# Patient Record
Sex: MALE | Race: WHITE | NOT HISPANIC OR LATINO | URBAN - METROPOLITAN AREA
[De-identification: names, ages, dates, MRNs, and addresses within clinical notes are randomized per-mention and may not be internally consistent; named-entity substitution may affect disease eponyms.]

---

## 2017-08-27 ENCOUNTER — EMERGENCY (EMERGENCY)
Facility: HOSPITAL | Age: 32
LOS: 1 days | Discharge: HOME | End: 2017-08-27
Attending: EMERGENCY MEDICINE
Payer: SELF-PAY

## 2017-08-27 VITALS
SYSTOLIC BLOOD PRESSURE: 110 MMHG | WEIGHT: 255.07 LBS | TEMPERATURE: 98 F | HEART RATE: 104 BPM | OXYGEN SATURATION: 96 % | RESPIRATION RATE: 17 BRPM | DIASTOLIC BLOOD PRESSURE: 80 MMHG

## 2017-08-27 DIAGNOSIS — Z77.21 CONTACT WITH AND (SUSPECTED) EXPOSURE TO POTENTIALLY HAZARDOUS BODY FLUIDS: ICD-10-CM
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
## 2017-08-27 DIAGNOSIS — S50.811A ABRASION OF RIGHT FOREARM, INITIAL ENCOUNTER: ICD-10-CM
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
## 2017-08-27 LAB
ALBUMIN SERPL ELPH-MCNC: 4.2 G/DL — SIGNIFICANT CHANGE UP (ref 3.5–5)
ALP SERPL-CCNC: 39 U/L — LOW (ref 40–120)
ALT FLD-CCNC: 93 U/L DA — HIGH (ref 10–60)
ANION GAP SERPL CALC-SCNC: 7 MMOL/L — SIGNIFICANT CHANGE UP (ref 5–17)
AST SERPL-CCNC: 52 U/L — HIGH (ref 10–40)
BASOPHILS # BLD AUTO: 0.1 K/UL — SIGNIFICANT CHANGE UP (ref 0–0.2)
BASOPHILS NFR BLD AUTO: 1.3 % — SIGNIFICANT CHANGE UP (ref 0–2)
BILIRUB SERPL-MCNC: 0.5 MG/DL — SIGNIFICANT CHANGE UP (ref 0.2–1.2)
BUN SERPL-MCNC: 12 MG/DL — SIGNIFICANT CHANGE UP (ref 7–18)
CALCIUM SERPL-MCNC: 9.2 MG/DL — SIGNIFICANT CHANGE UP (ref 8.4–10.5)
CHLORIDE SERPL-SCNC: 110 MMOL/L — HIGH (ref 96–108)
CO2 SERPL-SCNC: 26 MMOL/L — SIGNIFICANT CHANGE UP (ref 22–31)
CREAT SERPL-MCNC: 0.88 MG/DL — SIGNIFICANT CHANGE UP (ref 0.5–1.3)
EOSINOPHIL # BLD AUTO: 0.1 K/UL — SIGNIFICANT CHANGE UP (ref 0–0.5)
EOSINOPHIL NFR BLD AUTO: 1.4 % — SIGNIFICANT CHANGE UP (ref 0–6)
GLUCOSE SERPL-MCNC: 119 MG/DL — HIGH (ref 70–99)
HAV IGM SER-ACNC: SIGNIFICANT CHANGE UP
HBV CORE IGM SER-ACNC: SIGNIFICANT CHANGE UP
HBV SURFACE AG SER-ACNC: SIGNIFICANT CHANGE UP
HCT VFR BLD CALC: 49.3 % — SIGNIFICANT CHANGE UP (ref 39–50)
HCV AB S/CO SERPL IA: 0.1 S/CO — SIGNIFICANT CHANGE UP
HCV AB SERPL-IMP: SIGNIFICANT CHANGE UP
HGB BLD-MCNC: 17.4 G/DL — HIGH (ref 13–17)
HIV 1+2 AB+HIV1 P24 AG SERPL QL IA: SIGNIFICANT CHANGE UP
LYMPHOCYTES # BLD AUTO: 39.2 % — SIGNIFICANT CHANGE UP (ref 13–44)
LYMPHOCYTES # BLD AUTO: 4.1 K/UL — HIGH (ref 1–3.3)
MCHC RBC-ENTMCNC: 31.5 PG — SIGNIFICANT CHANGE UP (ref 27–34)
MCHC RBC-ENTMCNC: 35.2 GM/DL — SIGNIFICANT CHANGE UP (ref 32–36)
MCV RBC AUTO: 89.4 FL — SIGNIFICANT CHANGE UP (ref 80–100)
MONOCYTES # BLD AUTO: 0.7 K/UL — SIGNIFICANT CHANGE UP (ref 0–0.9)
MONOCYTES NFR BLD AUTO: 6.9 % — SIGNIFICANT CHANGE UP (ref 2–14)
NEUTROPHILS # BLD AUTO: 5.3 K/UL — SIGNIFICANT CHANGE UP (ref 1.8–7.4)
NEUTROPHILS NFR BLD AUTO: 51.3 % — SIGNIFICANT CHANGE UP (ref 43–77)
PLATELET # BLD AUTO: 192 K/UL — SIGNIFICANT CHANGE UP (ref 150–400)
POTASSIUM SERPL-MCNC: 4 MMOL/L — SIGNIFICANT CHANGE UP (ref 3.5–5.3)
POTASSIUM SERPL-SCNC: 4 MMOL/L — SIGNIFICANT CHANGE UP (ref 3.5–5.3)
PROT SERPL-MCNC: 7.8 G/DL — SIGNIFICANT CHANGE UP (ref 6–8.3)
RBC # BLD: 5.51 M/UL — SIGNIFICANT CHANGE UP (ref 4.2–5.8)
RBC # FLD: 11.2 % — SIGNIFICANT CHANGE UP (ref 10.3–14.5)
SODIUM SERPL-SCNC: 143 MMOL/L — SIGNIFICANT CHANGE UP (ref 135–145)
WBC # BLD: 10.4 K/UL — SIGNIFICANT CHANGE UP (ref 3.8–10.5)
WBC # FLD AUTO: 10.4 K/UL — SIGNIFICANT CHANGE UP (ref 3.8–10.5)

## 2017-08-27 PROCEDURE — 87389 HIV-1 AG W/HIV-1&-2 AB AG IA: CPT

## 2017-08-27 PROCEDURE — 85027 COMPLETE CBC AUTOMATED: CPT

## 2017-08-27 PROCEDURE — 99283 EMERGENCY DEPT VISIT LOW MDM: CPT

## 2017-08-27 PROCEDURE — 36415 COLL VENOUS BLD VENIPUNCTURE: CPT

## 2017-08-27 PROCEDURE — 36000 PLACE NEEDLE IN VEIN: CPT

## 2017-08-27 PROCEDURE — 99053 MED SERV 10PM-8AM 24 HR FAC: CPT

## 2017-08-27 PROCEDURE — 80053 COMPREHEN METABOLIC PANEL: CPT

## 2017-08-27 PROCEDURE — 99284 EMERGENCY DEPT VISIT MOD MDM: CPT | Mod: 25

## 2017-08-27 PROCEDURE — 80074 ACUTE HEPATITIS PANEL: CPT

## 2017-08-27 RX ORDER — RALTEGRAVIR 400 MG/1
1 TABLET, FILM COATED ORAL
Qty: 42 | Refills: 0 | OUTPATIENT
Start: 2017-08-27 | End: 2017-09-17

## 2017-08-27 RX ORDER — EMTRICITABINE AND TENOFOVIR DISOPROXIL FUMARATE 200; 300 MG/1; MG/1
1 TABLET, FILM COATED ORAL ONCE
Qty: 0 | Refills: 0 | Status: COMPLETED | OUTPATIENT
Start: 2017-08-27 | End: 2017-08-27

## 2017-08-27 RX ORDER — RALTEGRAVIR 400 MG/1
400 TABLET, FILM COATED ORAL ONCE
Qty: 0 | Refills: 0 | Status: COMPLETED | OUTPATIENT
Start: 2017-08-27 | End: 2017-08-27

## 2017-08-27 RX ORDER — EMTRICITABINE AND TENOFOVIR DISOPROXIL FUMARATE 200; 300 MG/1; MG/1
1 TABLET, FILM COATED ORAL
Qty: 21 | Refills: 0 | OUTPATIENT
Start: 2017-08-27 | End: 2017-09-17

## 2017-08-27 RX ADMIN — RALTEGRAVIR 400 MILLIGRAM(S): 400 TABLET, FILM COATED ORAL at 05:44

## 2017-08-27 RX ADMIN — EMTRICITABINE AND TENOFOVIR DISOPROXIL FUMARATE 1 TABLET(S): 200; 300 TABLET, FILM COATED ORAL at 05:44

## 2017-08-27 NOTE — ED PROVIDER NOTE - PROGRESS NOTE DETAILS
D/w pt based on risk will suggest HIV prophylaxis. Pt agrees. r/b/a explained. Educated on med use, ID f/u. Obtained contact info to make appt with ID, gave referral to ID at [facility].

## 2017-08-27 NOTE — ED PROVIDER NOTE - OBJECTIVE STATEMENT
32 y/o M pt with no significant PMHx presents to ED c/o exposure to blood x today. 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 Pt states he had already suffered abrasions to his right arm prior to being exposed to the other person's blood. Pt states he later learned that the person being apprehended is likely to be HIV positive. Pt denies fever, chills, nausea, vomiting, diarrhea, abd pain, H/O HIV, or any other complaints. Pt is unsure of last Hep C vaccination. NKDA. 32 y/o M pt with no significant PMHx presents to ED c/o exposure to blood x today. 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 Pt states he had already suffered abrasions to his right arm prior to being exposed to the other person's blood. Pt states he learned that the person later declared himself HIV positive. Pt denies fever, chills, nausea, vomiting, diarrhea, abd pain, H/O HIV, or any other complaints. Pt is unsure of his hepatitis vaccination status. NKDA.

## 2017-08-27 NOTE — ED PROVIDER NOTE - SKIN, MLM
Superficial abrasion to the right forearm. Focal abrasion to the right first DIP. No active bleeding.

## 2017-08-27 NOTE — ED PROVIDER NOTE - MEDICAL DECISION MAKING DETAILS
30 y/o M pt with possible exposure to body fluids of possible HIV positive source. Will do baseline test, administer PEP and ID f/u.

## 2018-01-17 ENCOUNTER — EMERGENCY (EMERGENCY)
Facility: HOSPITAL | Age: 33
LOS: 1 days | Discharge: HOME | End: 2018-01-17
Attending: EMERGENCY MEDICINE | Admitting: EMERGENCY MEDICINE
Payer: OTHER MISCELLANEOUS

## 2018-01-17 VITALS
SYSTOLIC BLOOD PRESSURE: 132 MMHG | TEMPERATURE: 98 F | DIASTOLIC BLOOD PRESSURE: 86 MMHG | OXYGEN SATURATION: 98 % | HEART RATE: 82 BPM | RESPIRATION RATE: 18 BRPM

## 2018-01-17 PROCEDURE — 73600 X-RAY EXAM OF ANKLE: CPT | Mod: 26,RT

## 2018-01-17 PROCEDURE — 99283 EMERGENCY DEPT VISIT LOW MDM: CPT

## 2018-01-17 RX ORDER — IBUPROFEN 200 MG
600 TABLET ORAL ONCE
Qty: 0 | Refills: 0 | Status: COMPLETED | OUTPATIENT
Start: 2018-01-17 | End: 2018-01-17

## 2018-01-17 RX ADMIN — Medication 600 MILLIGRAM(S): at 08:58

## 2018-01-17 NOTE — ED PROVIDER NOTE - PROGRESS NOTE DETAILS
Pt states feeling better. Ankle was ACE wrapped. Advised rest, ice, and elevation. Advised Motrin 600mg every 6 hours as needed for pain to be taken with food.

## 2018-01-17 NOTE — ED PROVIDER NOTE - OBJECTIVE STATEMENT
33y/o M with PMHx of asthma, presents to the ED s/p twisting his R ankle today c/o pain and swelling. Pt states he was walking down basement steps and twisted R ankle. Pt notes since having R ankle pain with swelling. Denies fall, LOC, HA, neck pain, back pain, dizziness, nausea/vomiting, any other complaints. NKDA.

## 2018-01-17 NOTE — ED PROVIDER NOTE - LOWER EXTREMITY EXAM, RIGHT
TENDERNESS/Tenderness to the R lateral malleolus with swelling. No tenderness to knee, full ROM. +DP and 5/5 strength to bilateral lower extremities./SWELLING

## 2019-07-20 ENCOUNTER — EMERGENCY (EMERGENCY)
Facility: HOSPITAL | Age: 34
LOS: 1 days | Discharge: HOME | End: 2019-07-20
Attending: EMERGENCY MEDICINE
Payer: SELF-PAY

## 2019-07-20 VITALS
RESPIRATION RATE: 18 BRPM | WEIGHT: 250 LBS | HEIGHT: 70 IN | SYSTOLIC BLOOD PRESSURE: 121 MMHG | DIASTOLIC BLOOD PRESSURE: 84 MMHG | TEMPERATURE: 98 F | OXYGEN SATURATION: 98 % | HEART RATE: 92 BPM

## 2019-07-20 PROCEDURE — 73610 X-RAY EXAM OF ANKLE: CPT

## 2019-07-20 PROCEDURE — 99284 EMERGENCY DEPT VISIT MOD MDM: CPT

## 2019-07-20 PROCEDURE — 73610 X-RAY EXAM OF ANKLE: CPT | Mod: 26,RT

## 2019-07-20 PROCEDURE — 99053 MED SERV 10PM-8AM 24 HR FAC: CPT

## 2019-07-20 PROCEDURE — 99283 EMERGENCY DEPT VISIT LOW MDM: CPT

## 2019-07-20 PROCEDURE — 73630 X-RAY EXAM OF FOOT: CPT | Mod: 26,RT

## 2019-07-20 PROCEDURE — 73630 X-RAY EXAM OF FOOT: CPT

## 2019-07-20 RX ORDER — ACETAMINOPHEN 500 MG
975 TABLET ORAL ONCE
Refills: 0 | Status: COMPLETED | OUTPATIENT
Start: 2019-07-20 | End: 2019-07-20

## 2019-07-20 NOTE — ED PROVIDER NOTE - PHYSICAL EXAMINATION
General: well appearing, interactive, well nourished, NAD  HEENT: pupils equal and reactive, normal oropharynx, normal external ears bilaterally   Cardiac: RRR, no MRG apprieciated  Resp: lungs clear to auscultation bilaterally, symmetric chest wall rise  Abd: soft, nontender, nondistended, normoactive bowel sounds  : no CVA tenderness  Neuro: Moving all extremities  Skin:  normal color for race  MSK: R lateral malleoli tenderness, swelling to lateral malleoli, no 5th metatarsal tenderness

## 2019-07-20 NOTE — ED PROVIDER NOTE - CLINICAL SUMMARY MEDICAL DECISION MAKING FREE TEXT BOX
Pt with accidental right ankle trauma inversion injury running to catch a person at work swelling pain LLE wnl Left ankle chronic swelling 2/2 surgery in past Right hip and knee wnl Right ankle swelling tender lateral malleolus anterior > posterior No tenderness over achilles tendon or 5th metatarsal No distal neuro vascular deficit xrays No fracture of ankle area Air cast splint NSAIDs d/c outpatient f/u --Villanueva

## 2019-07-20 NOTE — ED PROVIDER NOTE - NSFOLLOWUPINSTRUCTIONS_ED_ALL_ED_FT
(1) Follow up with your primary care physician as discussed. In addition, we did not find evidence of a life threatening illness on your testing here today, but listed below are the specialists that will be necessary to see as an outpatient to continue the workup.  Please call the numbers listed below or [phone] to set up the necessary appointments.  (2) Immediately seek care at your nearest emergency room if your worsen, persist, or do not resolve   (3) Take Tylenol (up to 1000mg or 1 g)  and/or motrin (up to 600mg) up to every 6 hours as needed for pain.

## 2019-07-20 NOTE — ED PROVIDER NOTE - OBJECTIVE STATEMENT
34yo M no pmhx pw cc of ankle pain    430 this am while chasing someone accidently everted foot. Has been able to ambulate but has pain with moving the foot    Meds: vitamin D 34yo M no pmhx pw cc of ankle pain    430 this am while chasing someone accidently inverted foot. Has been able to ambulate but has pain with moving the foot. Denies any other injuries  has not taken anything for pain today. Works as a     Meds: vitamin D

## 2019-07-20 NOTE — ED ADULT NURSE NOTE - OBJECTIVE STATEMENT
Male 33 years old came in for right ankle pain. Pt works as a  reports he accidentally twisted his right ankle while chasing someone. Able to ambulate but has pain when moving his foot. Denies numbness/tingling of right foot. Toes mobile. Pedal pulse palpable. Will continue to monitor.

## 2019-07-20 NOTE — ED PROVIDER NOTE - NSFOLLOWUPCLINICS_GEN_ALL_ED_FT
[facility]  Sports Medicine  [address] [place] [address]  Phone: [phone]  Fax:   Follow Up Time: Routine    [facility]  Orthopedic Surgery  [address], 3rd & [address]th [place] [address]  Phone: [phone]  Fax:   Follow Up Time: Routine

## 2019-07-20 NOTE — ED ADULT NURSE NOTE - NSIMPLEMENTINTERV_GEN_ALL_ED
Implemented All Universal Safety Interventions:  [place] to call system. Call bell, personal items and telephone within reach. Instruct patient to call for assistance. Room bathroom lighting operational. Non-slip footwear when patient is off stretcher. Physically safe environment: no spills, clutter or unnecessary equipment. Stretcher in lowest position, wheels locked, appropriate side rails in place.

## 2019-07-20 NOTE — ED PROVIDER NOTE - ATTENDING CONTRIBUTION TO CARE
I have seen and evaluated this patient with the resident.   I agree with the findings  unless other wise stated.  I have made appropriate changes in documentations where needed, After my face to face bedside evaluation, I am further  notinyo M no pmhx pw cc of ankle pain 430 this am while chasing someone accidently inverted foot. Has been able to ambulate but has pain with moving the foot. Denies any other injuries has not taken anything for pain today. Works as a . Pt with accidental right ankle trauma inversion injury running to catch a person at work swelling pain LLE wnl Left ankle chronic swelling 2/2 surgery in past Right hip and knee wnl Right ankle swelling tender lateral malleolus anterior > posterior No tenderness over achilles tendon or 5th metatarsal No distal neuro vascular deficit xrays No fracture of ankle area Air cast splint NSAIDs d/c outpatient f/u --Villanueva

## 2019-07-22 PROBLEM — J45.909 UNSPECIFIED ASTHMA, UNCOMPLICATED: Chronic | Status: ACTIVE | Noted: 2018-01-17

## 2022-02-28 NOTE — ED PROVIDER NOTE - CPE EDP NEURO NORM
Left message on answering machine to call office  Patient has an upcoming appt with Dr Gage- please have them check BP at this appt.   normal...

## 2024-09-16 ENCOUNTER — APPOINTMENT (OUTPATIENT)
Dept: ORTHOPEDIC SURGERY | Facility: CLINIC | Age: 39
End: 2024-09-16
Payer: OTHER MISCELLANEOUS

## 2024-09-16 VITALS — WEIGHT: 280 LBS | BODY MASS INDEX: 41.47 KG/M2 | HEIGHT: 69 IN

## 2024-09-16 PROCEDURE — 73630 X-RAY EXAM OF FOOT: CPT | Mod: RT

## 2024-09-16 PROCEDURE — 73590 X-RAY EXAM OF LOWER LEG: CPT | Mod: RT

## 2024-09-16 PROCEDURE — 99204 OFFICE O/P NEW MOD 45 MIN: CPT

## 2024-09-17 NOTE — WORK
[Crush Injury] : crush injury [Was the competent medical cause of the injury] : was the competent medical cause of the injury [Are consistent with the injury] : are consistent with the injury [Consistent with my objective findings] : consistent with my objective findings [Partial] : partial [Does not reveal pre-existing condition(s) that may affect treatment/prognosis] : does not reveal pre-existing condition(s) that may affect treatment/prognosis [Cannot return to work because ________] : cannot return to work because [unfilled] [Unknown at this time] : : unknown at this time [Patient] : patient [No] : No [No Rx restrictions] : No Rx restrictions. [I provided the services listed above] :  I provided the services listed above. [FreeTextEntry1] : good

## 2024-09-17 NOTE — HISTORY OF PRESENT ILLNESS
[de-identified] : WC 9/8/24: Patient is a 38 year year old male presenting for evaluation of his right lower leg. 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 He was evaluated at [facility] ER and diagnosed with contusion/hematoma. He had doppler study, negative for DVT. He presents today WB without assistive device. Ice and NSAIDS have been used. No previous injury/problem with right lower leg. He is currently OOW but is scheduled to RTW fully duty as of 9/17/24.

## 2024-09-17 NOTE — IMAGING
[de-identified] : R ankle/foot: - Significant swelling and ecchymosis medial RT LE, ecchymosis tracks into medial foot and ankle. - No pain with palpation of achilles, medial or lateral malleoli, base of 5th metatarsal, navicular, metatarsals, or digits. There is tenderness along the medial lower leg. - ROM intact throughout ankle dorsiflexion, plantarflexion, inversion, eversion. Toes with normal flexion and extension. - ROM intact throughout knee flexion, extension - 5/5 strength throughout knee and ankle - Negative anterior drawer, Kleigers, talar tilt, ankle squeeze test - Negative calcaneal and metatarsal squeeze - Distally neurovascularly intact [Right] : right tibia/fibula [There are no fractures, subluxations or dislocations. No significant abnormalities are seen] : There are no fractures, subluxations or dislocations. No significant abnormalities are seen [de-identified] : bipartite lateral sesamoid

## 2024-09-17 NOTE — IMAGING
[de-identified] : R ankle/foot: - Significant swelling and ecchymosis medial RT LE, ecchymosis tracks into medial foot and ankle. - No pain with palpation of achilles, medial or lateral malleoli, base of 5th metatarsal, navicular, metatarsals, or digits. There is tenderness along the medial lower leg. - ROM intact throughout ankle dorsiflexion, plantarflexion, inversion, eversion. Toes with normal flexion and extension. - ROM intact throughout knee flexion, extension - 5/5 strength throughout knee and ankle - Negative anterior drawer, Kleigers, talar tilt, ankle squeeze test - Negative calcaneal and metatarsal squeeze - Distally neurovascularly intact [Right] : right tibia/fibula [There are no fractures, subluxations or dislocations. No significant abnormalities are seen] : There are no fractures, subluxations or dislocations. No significant abnormalities are seen [de-identified] : bipartite lateral sesamoid

## 2024-09-17 NOTE — HISTORY OF PRESENT ILLNESS
[de-identified] : WC 9/8/24: Patient is a 38 year year old male presenting for evaluation of his right lower leg. 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 He was evaluated at [facility] ER and diagnosed with contusion/hematoma. He had doppler study, negative for DVT. He presents today WB without assistive device. Ice and NSAIDS have been used. No previous injury/problem with right lower leg. He is currently OOW but is scheduled to RTW fully duty as of 9/17/24.

## 2024-09-17 NOTE — ASSESSMENT
[FreeTextEntry1] : WC DOI 9/8/2024 stuck between firetruck dividers with large hematoma, significant bony tenderness over anteromedial tibia with ecchymosis tracking distally to medial ankle - ACE wrap applied - assisted walking if needed - MRI Right leg, tib/fib to evaluate for underlying hematoma, soft tissue injury - Ice, rest, elevation - NSAID, tylenol as needed - Out of work - Follow up after MRI

## 2024-09-18 ENCOUNTER — APPOINTMENT (OUTPATIENT)
Dept: MRI IMAGING | Facility: CLINIC | Age: 39
End: 2024-09-18
Payer: OTHER MISCELLANEOUS

## 2024-09-18 PROCEDURE — 73718 MRI LOWER EXTREMITY W/O DYE: CPT | Mod: RT

## 2024-09-23 ENCOUNTER — APPOINTMENT (OUTPATIENT)
Dept: ORTHOPEDIC SURGERY | Facility: CLINIC | Age: 39
End: 2024-09-23
Payer: OTHER MISCELLANEOUS

## 2024-09-23 DIAGNOSIS — R29.898 OTHER SYMPTOMS AND SIGNS INVOLVING THE MUSCULOSKELETAL SYSTEM: ICD-10-CM

## 2024-09-23 DIAGNOSIS — Z78.9 OTHER SPECIFIED HEALTH STATUS: ICD-10-CM

## 2024-09-23 DIAGNOSIS — S80.11XA CONTUSION OF RIGHT LOWER LEG, INITIAL ENCOUNTER: ICD-10-CM

## 2024-09-23 PROCEDURE — 99213 OFFICE O/P EST LOW 20 MIN: CPT

## 2024-09-24 NOTE — HISTORY OF PRESENT ILLNESS
[de-identified] : WC 9/8/24: Patient is a 38 year year old male presenting for evaluation of his right lower leg. 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 He was evaluated at [facility] ER and diagnosed with contusion/hematoma. He had doppler study, negative for DVT. He presents today WB without assistive device. Ice and NSAIDS have been used. No previous injury/problem with right lower leg. He is currently OOW but is scheduled to RTW fully duty as of 9/17/24.  09/23/24 - Pt is here for MRI review of right lower leg. He states pain is improving since onset, though continues to have sensation of tightness in leg and pain to touch. States he now also has pain along medial ankle, which was not present initially. He has been icing/elevating, taking Advil as needed for pain. He has been WB in sneakers. He is still OOW.

## 2024-09-24 NOTE — IMAGING
[Right] : right tibia/fibula [There are no fractures, subluxations or dislocations. No significant abnormalities are seen] : There are no fractures, subluxations or dislocations. No significant abnormalities are seen [de-identified] : R ankle/foot: - Improved swelling and ecchymosis medial RT LE. Resolving ecchymosis into medial foot and ankle. - No pain with palpation of achilles, medial or lateral malleoli, base of 5th metatarsal, navicular, metatarsals, or digits. There is tenderness along the medial lower leg. - ROM intact throughout ankle dorsiflexion, plantarflexion, inversion, eversion. Toes with normal flexion and extension. - ROM intact throughout knee flexion, extension - 5/5 strength throughout knee and ankle - Negative anterior drawer, Kleigers, talar tilt, ankle squeeze test - Negative calcaneal and metatarsal squeeze - Distally neurovascularly intact [de-identified] : bipartite lateral sesamoid

## 2024-09-24 NOTE — WORK
[Crush Injury] : crush injury [Was the competent medical cause of the injury] : was the competent medical cause of the injury [Are consistent with the injury] : are consistent with the injury [Consistent with my objective findings] : consistent with my objective findings [Partial] : partial [Does not reveal pre-existing condition(s) that may affect treatment/prognosis] : does not reveal pre-existing condition(s) that may affect treatment/prognosis [Cannot return to work because ________] : cannot return to work because [unfilled] [Unknown at this time] : : unknown at this time [Patient] : patient [No] : No [No Rx restrictions] : No Rx restrictions. [I provided the services listed above] :  I provided the services listed above. [Can return to work without limitations on ______] : can return to work without limitations on [unfilled] [FreeTextEntry1] : good

## 2024-09-24 NOTE — HISTORY OF PRESENT ILLNESS
[de-identified] : WC 9/8/24: Patient is a 38 year year old male presenting for evaluation of his right lower leg. 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 He was evaluated at [facility] ER and diagnosed with contusion/hematoma. He had doppler study, negative for DVT. He presents today WB without assistive device. Ice and NSAIDS have been used. No previous injury/problem with right lower leg. He is currently OOW but is scheduled to RTW fully duty as of 9/17/24.  09/23/24 - Pt is here for MRI review of right lower leg. He states pain is improving since onset, though continues to have sensation of tightness in leg and pain to touch. States he now also has pain along medial ankle, which was not present initially. He has been icing/elevating, taking Advil as needed for pain. He has been WB in sneakers. He is still OOW.

## 2024-09-24 NOTE — DATA REVIEWED
[Foot] : foot [MRI] : MRI [Report was reviewed and noted in the chart] : The report was reviewed and noted in the chart [I independently reviewed and interpreted images and report] : I independently reviewed and interpreted images and report [Lower Extremities] : lower extremities

## 2024-09-24 NOTE — IMAGING
[Right] : right tibia/fibula [There are no fractures, subluxations or dislocations. No significant abnormalities are seen] : There are no fractures, subluxations or dislocations. No significant abnormalities are seen [de-identified] : R ankle/foot: - Improved swelling and ecchymosis medial RT LE. Resolving ecchymosis into medial foot and ankle. - No pain with palpation of achilles, medial or lateral malleoli, base of 5th metatarsal, navicular, metatarsals, or digits. There is tenderness along the medial lower leg. - ROM intact throughout ankle dorsiflexion, plantarflexion, inversion, eversion. Toes with normal flexion and extension. - ROM intact throughout knee flexion, extension - 5/5 strength throughout knee and ankle - Negative anterior drawer, Kleigers, talar tilt, ankle squeeze test - Negative calcaneal and metatarsal squeeze - Distally neurovascularly intact [de-identified] : bipartite lateral sesamoid

## 2024-09-24 NOTE — ASSESSMENT
[FreeTextEntry1] : WC DOI 9/8/2024 stuck between firetruck dividers with large hematoma, significant bony tenderness over anteromedial tibia with ecchymosis tracking distally to medial ankle. MRI confirming hematoma without injury to underlying bone or musculature - Continue compression - Ice, rest, elevation - NSAID, tylenol as needed - Can return to work as tolerated - PT referral to help with hematoma resolution - Follow up in 3-4 weeks

## 2024-10-01 ENCOUNTER — NON-APPOINTMENT (OUTPATIENT)
Age: 39
End: 2024-10-01